# Patient Record
Sex: FEMALE | Race: OTHER | NOT HISPANIC OR LATINO | URBAN - METROPOLITAN AREA
[De-identification: names, ages, dates, MRNs, and addresses within clinical notes are randomized per-mention and may not be internally consistent; named-entity substitution may affect disease eponyms.]

---

## 2019-06-28 ENCOUNTER — EMERGENCY (EMERGENCY)
Facility: HOSPITAL | Age: 39
LOS: 1 days | Discharge: HOME | End: 2019-06-28
Attending: EMERGENCY MEDICINE | Admitting: EMERGENCY MEDICINE
Payer: MEDICAID

## 2019-06-28 VITALS
RESPIRATION RATE: 18 BRPM | DIASTOLIC BLOOD PRESSURE: 68 MMHG | SYSTOLIC BLOOD PRESSURE: 108 MMHG | TEMPERATURE: 98 F | OXYGEN SATURATION: 100 % | HEART RATE: 83 BPM

## 2019-06-28 DIAGNOSIS — Z98.891 HISTORY OF UTERINE SCAR FROM PREVIOUS SURGERY: Chronic | ICD-10-CM

## 2019-06-28 LAB
ALBUMIN SERPL ELPH-MCNC: 4.4 G/DL — SIGNIFICANT CHANGE UP (ref 3.3–5)
ALP SERPL-CCNC: 39 U/L — LOW (ref 40–120)
ALT FLD-CCNC: 12 U/L — SIGNIFICANT CHANGE UP (ref 4–33)
ANION GAP SERPL CALC-SCNC: 11 MMO/L — SIGNIFICANT CHANGE UP (ref 7–14)
ANISOCYTOSIS BLD QL: SLIGHT — SIGNIFICANT CHANGE UP
APPEARANCE UR: CLEAR — SIGNIFICANT CHANGE UP
AST SERPL-CCNC: 20 U/L — SIGNIFICANT CHANGE UP (ref 4–32)
BASOPHILS # BLD AUTO: 0.03 K/UL — SIGNIFICANT CHANGE UP (ref 0–0.2)
BASOPHILS NFR BLD AUTO: 0.4 % — SIGNIFICANT CHANGE UP (ref 0–2)
BILIRUB SERPL-MCNC: < 0.2 MG/DL — LOW (ref 0.2–1.2)
BILIRUB UR-MCNC: NEGATIVE — SIGNIFICANT CHANGE UP
BLD GP AB SCN SERPL QL: NEGATIVE — SIGNIFICANT CHANGE UP
BLOOD UR QL VISUAL: NEGATIVE — SIGNIFICANT CHANGE UP
BUN SERPL-MCNC: 10 MG/DL — SIGNIFICANT CHANGE UP (ref 7–23)
BURR CELLS BLD QL SMEAR: SLIGHT — SIGNIFICANT CHANGE UP
CALCIUM SERPL-MCNC: 9.2 MG/DL — SIGNIFICANT CHANGE UP (ref 8.4–10.5)
CHLORIDE SERPL-SCNC: 105 MMOL/L — SIGNIFICANT CHANGE UP (ref 98–107)
CO2 SERPL-SCNC: 23 MMOL/L — SIGNIFICANT CHANGE UP (ref 22–31)
COLOR SPEC: COLORLESS — SIGNIFICANT CHANGE UP
CREAT SERPL-MCNC: 0.7 MG/DL — SIGNIFICANT CHANGE UP (ref 0.5–1.3)
DACRYOCYTES BLD QL SMEAR: SLIGHT — SIGNIFICANT CHANGE UP
ELLIPTOCYTES BLD QL SMEAR: SLIGHT — SIGNIFICANT CHANGE UP
EOSINOPHIL # BLD AUTO: 0.16 K/UL — SIGNIFICANT CHANGE UP (ref 0–0.5)
EOSINOPHIL NFR BLD AUTO: 1.9 % — SIGNIFICANT CHANGE UP (ref 0–6)
GLUCOSE SERPL-MCNC: 113 MG/DL — HIGH (ref 70–99)
GLUCOSE UR-MCNC: NEGATIVE — SIGNIFICANT CHANGE UP
HCT VFR BLD CALC: 24.1 % — LOW (ref 34.5–45)
HGB BLD-MCNC: 6.6 G/DL — CRITICAL LOW (ref 11.5–15.5)
HYPOCHROMIA BLD QL: SIGNIFICANT CHANGE UP
IMM GRANULOCYTES NFR BLD AUTO: 0.4 % — SIGNIFICANT CHANGE UP (ref 0–1.5)
KETONES UR-MCNC: NEGATIVE — SIGNIFICANT CHANGE UP
LEUKOCYTE ESTERASE UR-ACNC: NEGATIVE — SIGNIFICANT CHANGE UP
LYMPHOCYTES # BLD AUTO: 2.32 K/UL — SIGNIFICANT CHANGE UP (ref 1–3.3)
LYMPHOCYTES # BLD AUTO: 28.2 % — SIGNIFICANT CHANGE UP (ref 13–44)
MCHC RBC-ENTMCNC: 17.6 PG — LOW (ref 27–34)
MCHC RBC-ENTMCNC: 27.4 % — LOW (ref 32–36)
MCV RBC AUTO: 64.4 FL — LOW (ref 80–100)
MICROCYTES BLD QL: SIGNIFICANT CHANGE UP
MONOCYTES # BLD AUTO: 0.53 K/UL — SIGNIFICANT CHANGE UP (ref 0–0.9)
MONOCYTES NFR BLD AUTO: 6.4 % — SIGNIFICANT CHANGE UP (ref 2–14)
NEUTROPHILS # BLD AUTO: 5.15 K/UL — SIGNIFICANT CHANGE UP (ref 1.8–7.4)
NEUTROPHILS NFR BLD AUTO: 62.7 % — SIGNIFICANT CHANGE UP (ref 43–77)
NITRITE UR-MCNC: NEGATIVE — SIGNIFICANT CHANGE UP
NRBC # FLD: 0 K/UL — SIGNIFICANT CHANGE UP (ref 0–0)
OVALOCYTES BLD QL SMEAR: SLIGHT — SIGNIFICANT CHANGE UP
PH UR: 7 — SIGNIFICANT CHANGE UP (ref 5–8)
PLATELET # BLD AUTO: 248 K/UL — SIGNIFICANT CHANGE UP (ref 150–400)
PLATELET COUNT - ESTIMATE: NORMAL — SIGNIFICANT CHANGE UP
PMV BLD: SIGNIFICANT CHANGE UP FL (ref 7–13)
POIKILOCYTOSIS BLD QL AUTO: SIGNIFICANT CHANGE UP
POLYCHROMASIA BLD QL SMEAR: SLIGHT — SIGNIFICANT CHANGE UP
POTASSIUM SERPL-MCNC: 3.8 MMOL/L — SIGNIFICANT CHANGE UP (ref 3.5–5.3)
POTASSIUM SERPL-SCNC: 3.8 MMOL/L — SIGNIFICANT CHANGE UP (ref 3.5–5.3)
PROT SERPL-MCNC: 7.5 G/DL — SIGNIFICANT CHANGE UP (ref 6–8.3)
PROT UR-MCNC: NEGATIVE — SIGNIFICANT CHANGE UP
RBC # BLD: 3.74 M/UL — LOW (ref 3.8–5.2)
RBC # FLD: 19.6 % — HIGH (ref 10.3–14.5)
RH IG SCN BLD-IMP: POSITIVE — SIGNIFICANT CHANGE UP
SODIUM SERPL-SCNC: 139 MMOL/L — SIGNIFICANT CHANGE UP (ref 135–145)
SP GR SPEC: 1.01 — SIGNIFICANT CHANGE UP (ref 1–1.04)
UROBILINOGEN FLD QL: NORMAL — SIGNIFICANT CHANGE UP
WBC # BLD: 8.22 K/UL — SIGNIFICANT CHANGE UP (ref 3.8–10.5)
WBC # FLD AUTO: 8.22 K/UL — SIGNIFICANT CHANGE UP (ref 3.8–10.5)

## 2019-06-28 PROCEDURE — 99284 EMERGENCY DEPT VISIT MOD MDM: CPT

## 2019-06-28 NOTE — ED PROVIDER NOTE - CLINICAL SUMMARY MEDICAL DECISION MAKING FREE TEXT BOX
40yo woman presents with generalized weakness and intermittent lightheadedness in setting of low hgb likely due to menorrhagia with no other source of bleeding and not currently bleeding. Will check for anemia and transfuse as necessary and trend CBC afterwards. Will continue to monitor and reassess.

## 2019-06-28 NOTE — ED PROVIDER NOTE - PROGRESS NOTE DETAILS
Ann Jean, resident MD: hgb 6.6, consented for transfusion and ordered for PRBC Ann Jean, resident MD: pt received 1u PRBC and is not lightheaded and not actively bleeding. will discharge patient home at this time with iron pills to take. printed out copies of results for patient to take home. discussed return precautions and need for outpatient follow up.

## 2019-06-28 NOTE — ED ADULT NURSE NOTE - CHPI ED NUR SYMPTOMS NEG
no fever/no decreased eating/drinking/no chills/no tingling/no dizziness/no nausea/no pain/no weakness/no vomiting

## 2019-06-28 NOTE — ED PROVIDER NOTE - NSFOLLOWUPCLINICS_GEN_ALL_ED_FT
[facility]st[facility]  Gynceology/OB  [address] [place] [address]  Phone: [phone]  Fax:   Follow Up Time:

## 2019-06-28 NOTE — ED PROVIDER NOTE - NSFOLLOWUPINSTRUCTIONS_ED_ALL_ED_FT
Please follow up with your primary care provider in the next few days.    Take the iron pills, 1 pill Monday, Wednesday, and Friday for the next month.    Return to the ED for any worsening symptoms of lightheadedness, loss of consciousness, chest pain, difficulty breathing, or any new or concerning symptoms.    Please read all attached. Please follow up with your primary care provider in the next few days.    Please also follow up with a gynecologist. You can call the number attached to make an appointment.    Take the iron pills, 1 pill Monday, Wednesday, and Friday for the next month.    Return to the ED for any worsening symptoms of lightheadedness, loss of consciousness, chest pain, difficulty breathing, or any new or concerning symptoms.    Please read all attached.

## 2019-06-28 NOTE — ED ADULT NURSE REASSESSMENT NOTE - NS ED NURSE REASSESS COMMENT FT1
Pt A&Ox3. Pt started on transfusion. pt with 20 gauge to the right AC. Pt tolerating transfusion well at this time. Denies chest pain, sob, dizziness, headache, itchiness, burning at site. Airway patent, pt speaking in full sentences. respirations equal and nonlabored, no respiratory distress noted. vitals as noted. family at bedside, pt stable, will continue to reassess

## 2019-06-28 NOTE — ED PROVIDER NOTE - NS ED ROS FT
General: +abnormal temperature sensation, +generalized weakness  Head: +lightheadedness, no headache  Eyes: no vision change  ENT: no ear pain  CV: no chest pain  Resp: no SOB  GI: no N/V/D, no blood in stool  : no dysuria, no hematuria, no current vaginal bleeding  MSK: no joint pain  Skin: no new rash  Neuro: no focal weakness

## 2019-06-28 NOTE — ED ADULT NURSE REASSESSMENT NOTE - NS ED NURSE REASSESS COMMENT FT1
Pt remains tolerating transfusion well. Airway patent, pt speaking in full sentences. respirations equal, nonlabored, no respiratory distress noted. Denies any complaints at this time. Pt noted to be slightly hypotensive but denies any symptoms. Denies chest pain, sob, dizziness, headache, itchiness, rash, fever. Vitals as noted. Pt stable, family remains at bedside. will continue to reassess

## 2019-06-28 NOTE — ED ADULT NURSE NOTE - OBJECTIVE STATEMENT
Patient reports vaginal bleeding for 1 week, patient told by PMD to come to ED for low Hbg. Patient's Hbg <7.0, consent signed for blood transfusion. Patient has #18g placed into right AC, labs drawn. 2ndary type sent to blood bank. Patient's VSS, mentating well, conversing appropriately. Patient denies any CP or SOB.

## 2019-06-28 NOTE — ED PROVIDER NOTE - PHYSICAL EXAMINATION
General: well-appearing young woman in no acute distress  Head: normocephalic, atraumatic  Eyes: PERRL, mildly pale conjunctiva  Mouth: moist mucous membranes  Neck: supple neck  CV: normal rate and rhythm, normal S1 and S2  Respiratory: clear to auscultation bilaterally  Abdomen: soft, nontender, nondistended  Back: no midline tenderness to palpation, no CVAT  Neuro: alert and oriented x3, speech clear, strength 5/5 UE and LE bilaterally, sensation equal and intact bilaterally  Extremities: no LE edema or tenderness to palpation, peripheral pulses 2+ bilaterally

## 2019-06-28 NOTE — ED ADULT TRIAGE NOTE - CHIEF COMPLAINT QUOTE
Pt with history of heavy vaginal bleeding sent by PMD for low Hg (6.2) and weakness, dizziness, and SOB for the past few days.

## 2019-06-28 NOTE — ED PROVIDER NOTE - ATTENDING CONTRIBUTION TO CARE
Attending note:   After face to face evaluation of this patient, I concur with above noted hx, pe, and care plan for this patient.  38 y/o F with weakness, low crit and heavy menses noted 1 month ago.   Evaluation in progress

## 2019-06-28 NOTE — ED PROVIDER NOTE - OBJECTIVE STATEMENT
38yo woman PMH menorrhagia presents with low hgb found at PCP office. 38yo woman PMH menorrhagia presents with low hgb found at PCP office. Pt reports that she has heavy menstrual periods with lots of cramping, LMP 5/29/19. She does not see a gynecologist. She had been feeling lightheaded and weak in the last few weeks with change in temperature perception and went to PCP and had blood tested and was called to come to the ED for low hgb. Pt denies n/v/d, blood in stool, dysuria, blood in urine, no cough, no chest pain or SOB. She is not currently on her menses.

## 2019-06-29 VITALS
DIASTOLIC BLOOD PRESSURE: 66 MMHG | TEMPERATURE: 98 F | HEART RATE: 64 BPM | SYSTOLIC BLOOD PRESSURE: 106 MMHG | RESPIRATION RATE: 17 BRPM | OXYGEN SATURATION: 100 %

## 2019-06-29 RX ORDER — FERROUS FUMARATE 350(115)MG
1 TABLET ORAL
Qty: 13 | Refills: 0
Start: 2019-06-29 | End: 2019-07-28

## 2019-06-29 NOTE — ED ADULT NURSE REASSESSMENT NOTE - NS ED NURSE REASSESS COMMENT FT1
Pt discharged home with family, ambulatory out of the ED. IV removed. Pt appears stable, comfortable and in no apparent distress. respirations are equal and unlabored. denies any medical complaints. Pt stable

## 2019-06-29 NOTE — ED ADULT NURSE REASSESSMENT NOTE - NS ED NURSE REASSESS COMMENT FT1
Pt tolerated blood transfusion well. No signs of transfusion reaction. airway patent, pt speaking in full sentences. respirations equal ,nonlabored, no respiratory distress noted. Denies chest pain, sob, n/v/d, dizziness, headache, itchiness, rash, fever. Vitally stable. Denies any other complaints. Pt stable, awaiting further plan

## 2019-07-01 PROBLEM — Z00.00 ENCOUNTER FOR PREVENTIVE HEALTH EXAMINATION: Status: ACTIVE | Noted: 2019-07-01

## 2022-06-10 ENCOUNTER — NON-APPOINTMENT (OUTPATIENT)
Age: 42
End: 2022-06-10